# Patient Record
Sex: MALE | Race: WHITE | NOT HISPANIC OR LATINO
[De-identification: names, ages, dates, MRNs, and addresses within clinical notes are randomized per-mention and may not be internally consistent; named-entity substitution may affect disease eponyms.]

---

## 2019-01-07 ENCOUNTER — APPOINTMENT (OUTPATIENT)
Dept: ORTHOPEDIC SURGERY | Facility: CLINIC | Age: 79
End: 2019-01-07

## 2019-01-07 PROBLEM — Z00.00 ENCOUNTER FOR PREVENTIVE HEALTH EXAMINATION: Status: ACTIVE | Noted: 2019-01-07

## 2019-01-16 ENCOUNTER — APPOINTMENT (OUTPATIENT)
Dept: ORTHOPEDIC SURGERY | Facility: CLINIC | Age: 79
End: 2019-01-16
Payer: MEDICARE

## 2019-01-16 VITALS — HEIGHT: 69 IN | WEIGHT: 185 LBS | BODY MASS INDEX: 27.4 KG/M2

## 2019-01-16 DIAGNOSIS — S42.272A TORUS FRACTURE OF UPPER END OF LEFT HUMERUS, INITIAL ENCOUNTER FOR CLOSED FRACTURE: ICD-10-CM

## 2019-01-16 PROCEDURE — 99203 OFFICE O/P NEW LOW 30 MIN: CPT

## 2019-01-16 NOTE — HISTORY OF PRESENT ILLNESS
[FreeTextEntry1] : This is the first visit of 78 years old male , one week ago patient sustained an injury resulted in a transverse fracture left proximal humerus which was treated by immobilization splint, coarctation brace. In a patient that 40 years ago at the age of 39 years old patient presented with an osteochondroma involving the left proximal humerus at that time and Dr. Filippo Garza from [facility], operated the patient and the underwent partial resection of the osteochondroma patient presents for level of activity without any discomfort until recently when patient fell and broke the humerus.

## 2019-01-16 NOTE — PHYSICAL EXAM
[FreeTextEntry1] : Physical exam reveals a healthy-looking patient in no apparent distress patient appeared to be free of their oriented examination of the left upper extremity demonstrates coarctation brace and no swelling no palpable mass patient has been good range of motion at the elbow no gross neurovascular deficit x-ray of the left humerus demonstrate a transverse fracture with mild displacement of the osteochondroma. At this stage patient was recommended to be treated conservatively with a sling and to be seen again in one month for followup at the next possible future surgical procedure as the bone could not be ruled out

## 2019-02-14 ENCOUNTER — APPOINTMENT (OUTPATIENT)
Dept: ORTHOPEDIC SURGERY | Facility: CLINIC | Age: 79
End: 2019-02-14
Payer: MEDICARE

## 2019-02-14 VITALS
DIASTOLIC BLOOD PRESSURE: 80 MMHG | WEIGHT: 185 LBS | HEIGHT: 69 IN | HEART RATE: 64 BPM | BODY MASS INDEX: 27.4 KG/M2 | SYSTOLIC BLOOD PRESSURE: 132 MMHG

## 2019-02-14 PROCEDURE — 99213 OFFICE O/P EST LOW 20 MIN: CPT

## 2019-02-14 PROCEDURE — 73060 X-RAY EXAM OF HUMERUS: CPT | Mod: LT

## 2019-02-14 NOTE — HISTORY OF PRESENT ILLNESS
[FreeTextEntry1] : Patient with a previous history of osteochondroma involving the left proximal femur of birth 6 weeks ago patient sustained a twisting injury resulted in transverse fracture left proximal humerus this was treated by a coarctation splint, gradually pain subsided the patient doing quite good

## 2019-02-14 NOTE — PHYSICAL EXAM
[FreeTextEntry1] : Physical exam reveals a healthy-looking patient in no apparent distress patient having no pain no complaints patient is fully alert oriented and drainage of the left arm demonstrates stable motion of the left upper arm no palpable mass no gross neurovascular deficits new plain x-rays of the left humerus demonstrate any callus formation stable fracture. At this stage patient was recommended to continue with the splint for another 3 weeks and to be seen again in one month for followup

## 2019-03-20 ENCOUNTER — FORM ENCOUNTER (OUTPATIENT)
Age: 79
End: 2019-03-20

## 2019-03-21 ENCOUNTER — APPOINTMENT (OUTPATIENT)
Age: 79
End: 2019-03-21
Payer: MEDICARE

## 2019-03-21 ENCOUNTER — OUTPATIENT (OUTPATIENT)
Dept: OUTPATIENT SERVICES | Facility: HOSPITAL | Age: 79
LOS: 1 days | End: 2019-03-21
Payer: MEDICARE

## 2019-03-21 VITALS
HEART RATE: 51 BPM | OXYGEN SATURATION: 97 % | WEIGHT: 183.03 LBS | BODY MASS INDEX: 27.11 KG/M2 | SYSTOLIC BLOOD PRESSURE: 123 MMHG | HEIGHT: 69 IN | DIASTOLIC BLOOD PRESSURE: 76 MMHG

## 2019-03-21 DIAGNOSIS — S42.309A UNSPECIFIED FRACTURE OF SHAFT OF HUMERUS, UNSPECIFIED ARM, INITIAL ENCOUNTER FOR CLOSED FRACTURE: ICD-10-CM

## 2019-03-21 PROCEDURE — 73060 X-RAY EXAM OF HUMERUS: CPT

## 2019-03-21 PROCEDURE — 99213 OFFICE O/P EST LOW 20 MIN: CPT

## 2019-03-21 PROCEDURE — 73060 X-RAY EXAM OF HUMERUS: CPT | Mod: 26,LT

## 2019-03-21 NOTE — PHYSICAL EXAM
[FreeTextEntry1] : Exam revealed a healthy-looking patient in no apparent distress patient having no significant complaints examination of the left upper extremity demonstrates good range of motion of the shoulder elbow no swelling no palpable mass.\par \par For the left humerus demonstrate a stable fracture with any healing of this at this stage the patient was recommended to start mobilizing his upper extremity and to be seen again in 2 months for followup

## 2019-03-21 NOTE — HISTORY OF PRESENT ILLNESS
MRN:[id number]                      After Visit Summary   3/7/2018    Karlos Wood    MRN: [id number]           Thank you!     Thank you for choosing [facility] for your care. Our goal is always to provide you with excellent care. Hearing back from our patients is one way we can continue to improve our services. Please take a few minutes to complete the written survey that you may receive in the mail after you visit with us. Thank you!        Patient Information     Date Of Birth          1944        About your hospital stay     You were admitted on:  March 7, 2018 You last received care in the:  [facility] PACU    You were discharged on:  March 7, 2018       Who to Call     For medical emergencies, please call 911.  For non-urgent questions about your medical care, please call your primary care provider or clinic, [phone]  For questions related to your surgery, please call your surgery clinic        Attending Provider     Provider Specialty    Justo Otto MD General Surgery       Primary Care Provider Office Phone # Fax #    Terence Thornton [phone]      After Care Instructions     Diet Instructions       Resume pre-procedure diet.  Keep stools soft with stool softeners and MOM.            Discharge Instructions       Patient to follow up with appointment in 1-2 weeks            No Alcohol       For 24 hours post procedure            No driving or operating machinery        until the day after procedure            Weight bearing status - As tolerated                 Your next 10 appointments already scheduled     Mar 20, 2018 10:00 AM CDT   [facility]    [address] [address]   [phone]            Mar 20, 2018 10:45 AM CDT   [facility]    [address]  [facility] [address]   [phone]              Further instructions from your care team                           Same Day Surgery Discharge Instructions  Special Precautions After Surgery - Adult    1. It is not unusual to feel lightheaded or faint, up to 24 hours after surgery or while taking pain medication.  If you have these symptoms; sit for a few minutes before standing and have someone assist you when getting up.  2. You should rest and relax for the next 24 hours and must have someone stay with you for at least 24 hours after your discharge.  3. DO NOT DRIVE any vehicle or operate mechanical equipment for 24 hours following the end of your surgery.  DO NOT DRIVE while taking narcotic pain medications that have been prescribed by your physician.  If you had a limb operated on, you must be able to use it fully to drive.  4. DO NOT drink alcoholic beverages for 24 hours following surgery or while taking prescription pain medication.  5. Drink clear liquids (apple juice, ginger ale, broth, 7-Up, etc.).  Progress to your regular diet as you feel able.  6. Any questions call your physician and do not make important decisions for 24 hours.    ACTIVITY  ? Per surgeon     INCISIONAL CARE  ? Change fluffs as needed  ? Keep stools soft with Milk of Magnesia     Call for an appointment to return to the clinic 1-2 weeks with Dr. Montana    Medications:  ? Hydrocodone (Norco, Vicodin):  Next dose: 6PM.  ? Follow the instructions on the bottle.       __________________________________________________________________________________________________________________________________  IMPORTANT NUMBERS:    [facility] [place]  [phone], [phone]  Pharmacy:  [phone]  Same Day Surgery:  [phone], Monday - Friday until 8:30 p.m.  Urgent Care:  [phone]  Emergency Room:  [phone]      [facility]:  [phone]                                                                            [FreeTextEntry1] : Patient was seen today in followup almost 3 months post fracture involving the left proximal humerus through an existing osteochondroma of the proximal humerus. Patient was treated by immobilization splint. At this stage the pain subsided patient regained good motion of the left upper extremity "  [facility]:  [phone]           Pending Results     Date and Time Order Name Status Description    3/7/2018 1343 Surgical pathology exam In process             Admission Information     Date & Time Provider Department Dept. Phone    3/7/2018 Justo Otto MD [facility] PACU [phone]      Your Vitals Were     Blood Pressure Pulse Temperature Respirations Height Weight    139/87 70 97.5  F (36.4  C) (Oral) 12 1.734 m (5' 8.27\") 87.1 kg (192 lb)    Pulse Oximetry BMI (Body Mass Index)                92% 28.96 kg/m2          MyChart Information     [facility] lets you send messages to your doctor, view your test results, renew your prescriptions, schedule appointments and more. To sign up, go to [email or web address] . Click on \"Log in\" on the left side of the screen, which will take you to the Welcome page. Then click on \"Sign up Now\" on the right side of the page.     You will be asked to enter the access code listed below, as well as some personal information. Please follow the directions to create your username and password.     Your access code is: [email or web address]  Expires: 2018  9:12 AM     Your access code will  in 90 days. If you need help or a new code, please call your [place] clinic or [phone].        Care EveryWhere ID     This is your Care EveryWhere ID. This could be used by other organizations to access your [place] medical records  [id number]        Equal Access to Services     PROSPER MENESES : Hadheri cerna Sojay jay, waaxda luqadaha, qaybta kaalmada delores, naomi idiin hayaan adegerardo fernandez. So [facility] [phone].    ATENCIÓN: Si habla español, tiene a arora disposición servicios gratuitos de asistencia lingüística. Llame al [phone].    We comply with applicable federal civil rights laws and [place] laws. We do not discriminate on the basis of race, color, national origin, age, disability, sex, sexual orientation, or gender " identity.               Review of your medicines      START taking        Dose / Directions    HYDROcodone-acetaminophen 5-325 MG per tablet   Commonly known as:  NORCO   Used for:  Post-op pain        Dose:  1-2 tablet   Take 1-2 tablets by mouth every 4 hours as needed for other (Moderate to Severe Pain)   Quantity:  30 tablet   Refills:  0         CONTINUE these medicines which have NOT CHANGED        Dose / Directions    BENADRYL 25 MG tablet   Generic drug:  diphenhydrAMINE        1 TABLET EVERY 4 TO 6 HOURS AS NEEDED   Quantity:  56   Refills:  0       CIALIS 10 MG tablet   Generic drug:  tadalafil        Take by mouth daily as needed for erectile dysfunction   Refills:  0       diflorasone 0.05 % ointment   Commonly known as:  PSORCON   Used for:  Eczema        Apply to Leg eczema once daily as needed.   Quantity:  30 g   Refills:  3       hydrocortisone 2.5 % cream   Commonly known as:  ANUSOL-HC   Used for:  External hemorrhoids        Place rectally 2 times daily   Quantity:  30 g   Refills:  0       hydrOXYzine 25 MG tablet   Commonly known as:  ATARAX   Used for:  Rash        Dose:  25-50 mg   Take 1-2 tablets (25-50 mg) by mouth every 6 hours as needed for itching   Quantity:  60 tablet   Refills:  1       IBUPROFEN PO        Dose:  400 mg   Take 400 mg by mouth every 6 hours as needed for moderate pain   Refills:  0       MICONAZOLE        applying to toes PRN   Refills:  0       simvastatin 20 MG tablet   Commonly known as:  ZOCOR   Used for:  Hyperlipidemia LDL goal <130        Dose:  20 mg   Take 1 tablet (20 mg) by mouth At Bedtime   Quantity:  90 tablet   Refills:  3       TYLENOL PO        Dose:  500 mg   Take 500 mg by mouth   Refills:  0            Where to get your medicines      Some of these will need a paper prescription and others can be bought over the counter. Ask your nurse if you have questions.     Bring a paper prescription for each of these medications     HYDROcodone-acetaminophen  5-325 MG per tablet                Protect others around you: Learn how to safely use, store and throw away your medicines at www.disposemymeds.org.        Information about OPIOIDS     PRESCRIPTION OPIOIDS: WHAT YOU NEED TO KNOW    Prescription opioids can be used to help relieve moderate to severe pain and are often prescribed following a surgery or injury, or for certain health conditions. These medications can be an important part of treatment but also come with serious risks. It is important to work with your health care provider to make sure you are getting the safest, most effective care.    WHAT ARE THE RISKS AND SIDE EFFECTS OF OPIOID USE?  Prescription opioids carry serious risks of addiction and overdose, especially with prolonged use. An opioid overdose, often marked by slowed breathing can cause sudden death. The use of prescription opioids can have a number of side effects as well, even when taken as directed:      Tolerance - meaning you might need to take more of a medication for the same pain relief    Physical dependence - meaning you have symptoms of withdrawal when a medication is stopped    Increased sensitivity to pain    Constipation    Nausea, vomiting, and dry mouth    Sleepiness and dizziness    Confusion    Depression    Low levels of testosterone that can result in lower sex drive, energy, and strength    Itching and sweating    RISKS ARE GREATER WITH:    History of drug misuse, substance use disorder, or overdose    Mental health conditions (such as depression or anxiety)    Sleep apnea    Older age (65 years or older)    Pregnancy    Avoid alcohol while taking prescription opioids.   Also, unless specifically advised by your health care provider, medications to avoid include:    Benzodiazepines (such as Xanax or Valium)    Muscle relaxants (such as Soma or Flexeril)    Hypnotics (such as Ambien or Lunesta)    Other prescription opioids    KNOW YOUR OPTIONS:  Talk to your health care  provider about ways to manage your pain that do not involve prescription opioids. Some of these options may actually work better and have fewer risks and side effects:    Pain relievers such as acetaminophen, ibuprofen, and naproxen    Some medications that are also used for depression or seizures    Physical therapy and exercise    Cognitive behavioral therapy, a psychological, goal-directed approach, in which patients learn how to modify physical, behavioral, and emotional triggers of pain and stress    IF YOU ARE PRESCRIBED OPIOIDS FOR PAIN:    Never take opioids in greater amounts or more often than prescribed    Follow up with your primary health care provider and work together to create a plan on how to manage your pain.    Talk about ways to help manage your pain that do not involve prescription opioids    Talk about all concerns and side effects    Help prevent misuse and abuse    Never sell or share prescription opioids    Never use another person's prescription opioids    Store prescription opioids in a secure place and out of reach of others (this may include visitors, children, friends, and family)    Visit www.cdc.gov/drugoverdose to learn about risks of opioid abuse and overdose    If you believe you may be struggling with addiction, tell your health care provider and ask for guidance or call [facility]'s National Helpline at 4-602-263-HELP    LEARN MORE / www.cdc.gov/drugoverdose/prescribing/guideline.html    Safely dispose of unused prescription opioids: Find your local drug take-back programs and more information about the importance of safe disposal at www.doseofreality.[place].gov             Medication List: This is a list of all your medications and when to take them. Check marks below indicate your daily home schedule. Keep this list as a reference.      Medications           Morning Afternoon Evening Bedtime As Needed    BENADRYL 25 MG tablet   1 TABLET EVERY 4 TO 6 HOURS AS NEEDED   Generic drug:   diphenhydrAMINE                                CIALIS 10 MG tablet   Take by mouth daily as needed for erectile dysfunction   Generic drug:  tadalafil                                diflorasone 0.05 % ointment   Commonly known as:  PSORCON   Apply to Leg eczema once daily as needed.                                HYDROcodone-acetaminophen 5-325 MG per tablet   Commonly known as:  NORCO   Take 1-2 tablets by mouth every 4 hours as needed for other (Moderate to Severe Pain)   Last time this was given:  1 tablet on 3/7/2018  2:54 PM                                hydrocortisone 2.5 % cream   Commonly known as:  ANUSOL-HC   Place rectally 2 times daily                                hydrOXYzine 25 MG tablet   Commonly known as:  ATARAX   Take 1-2 tablets (25-50 mg) by mouth every 6 hours as needed for itching                                IBUPROFEN PO   Take 400 mg by mouth every 6 hours as needed for moderate pain                                MICONAZOLE   applying to toes PRN                                simvastatin 20 MG tablet   Commonly known as:  ZOCOR   Take 1 tablet (20 mg) by mouth At Bedtime                                TYLENOL PO   Take 500 mg by mouth

## 2022-09-30 ENCOUNTER — RESULT REVIEW (OUTPATIENT)
Age: 82
End: 2022-09-30

## 2023-12-30 ENCOUNTER — NON-APPOINTMENT (OUTPATIENT)
Age: 83
End: 2023-12-30

## 2024-02-20 ENCOUNTER — APPOINTMENT (OUTPATIENT)
Dept: ORTHOPEDIC SURGERY | Facility: CLINIC | Age: 84
End: 2024-02-20
Payer: MEDICARE

## 2024-02-20 VITALS — HEIGHT: 68 IN | BODY MASS INDEX: 25.01 KG/M2 | WEIGHT: 165 LBS

## 2024-02-20 DIAGNOSIS — I10 ESSENTIAL (PRIMARY) HYPERTENSION: ICD-10-CM

## 2024-02-20 DIAGNOSIS — M43.9 DEFORMING DORSOPATHY, UNSPECIFIED: ICD-10-CM

## 2024-02-20 DIAGNOSIS — M47.816 SPONDYLOSIS W/OUT MYELOPATHY OR RADICULOPATHY, LUMBAR REGION: ICD-10-CM

## 2024-02-20 PROCEDURE — 72100 X-RAY EXAM L-S SPINE 2/3 VWS: CPT

## 2024-02-20 PROCEDURE — 72170 X-RAY EXAM OF PELVIS: CPT

## 2024-02-20 PROCEDURE — 99204 OFFICE O/P NEW MOD 45 MIN: CPT

## 2024-02-20 RX ORDER — LOSARTAN POTASSIUM 50 MG/1
50 TABLET, FILM COATED ORAL
Refills: 0 | Status: ACTIVE | COMMUNITY

## 2024-02-20 RX ORDER — PREDNISONE 10 MG/1
10 TABLET ORAL
Refills: 0 | Status: ACTIVE | COMMUNITY

## 2024-02-20 RX ORDER — MELOXICAM 15 MG/1
15 TABLET ORAL
Qty: 30 | Refills: 2 | Status: ACTIVE | COMMUNITY
Start: 2024-02-20 | End: 1900-01-01

## 2024-02-20 NOTE — DISCUSSION/SUMMARY
[de-identified] : Pedro has likely a new T12 compression fracture.  He has midline tenderness at his thoracolumbar junction.  The T12 compression deformity was not noted on his previous lumbar spine MRIs which he brought with him today.  He has severe osteopenia from steroid use for myasthenia gravis.  We discussed conservative treatment with physical therapy and anti-inflammatories.  Given that he does not have any radicular pain or red other red flag symptoms we will defer MRI today.  I will see him back in 6 to 8 weeks for recheck.  If his back is not better at that point we will get an MRI and refer him to pain management.  All of his questions were answered he is in agreement with this plan.

## 2024-02-20 NOTE — IMAGING
[de-identified] :  A&Ox3, NAD Gait non-antalgic    Spine Exam:  ROM  Flexion - limited 2/2 pain  Extension - limited 2/2 pain  Sidebending L and R-  limited 2/2 pain    Midline tenderness - no Paraspinal tenderness/spasm - yes     L Side Strength: Hip flexion - 5  Hip abduction - 5 Knee extension - 5 Knee flexion - 5 DF- 5 PF - 5 EHL - 5    Straight leg raise test - pos for back pain, no leg pain Cx Straight leg raise test - neg    R Side Strength:  Hip flexion - 5 Hip abduction - 5 Knee extension - 5 Knee flexion - 5 DF- 5 PF - 5 EHL - 5   Straight leg raise test - pos for back pain, no leg pain Cx Straight leg raise test - neg     Sensation: Intact L5-S1 bilaterally - yes    Reflexes: Patellar/Achilles reflex intact bilaterally    DP/PT 2+ [Facet arthropathy] : Facet arthropathy [Disc space narrowing] : Disc space narrowing [Scoliosis] : Scoliosis [FreeTextEntry1] : T7, T8, T10, T12 compression deformities

## 2024-02-20 NOTE — HISTORY OF PRESENT ILLNESS
[5] : 5 [de-identified] : 2/20/24 84 y.o here for middle back pain. He states no injury he just woke up and it has been bothering him.  No radicular pain, no b/b incontinence.  Has hx of multiple thoracic compression fractures, has had VIVIAN in the past which was helpful.